# Patient Record
Sex: MALE | Race: WHITE | URBAN - METROPOLITAN AREA
[De-identification: names, ages, dates, MRNs, and addresses within clinical notes are randomized per-mention and may not be internally consistent; named-entity substitution may affect disease eponyms.]

---

## 2022-09-28 ENCOUNTER — OFFICE VISIT (OUTPATIENT)
Dept: URBAN - METROPOLITAN AREA CLINIC 48 | Facility: CLINIC | Age: 62
End: 2022-09-28

## 2022-09-28 DIAGNOSIS — D31.32 BENIGN NEOPLASM OF LEFT CHOROID: Primary | ICD-10-CM

## 2022-09-28 PROCEDURE — 99204 OFFICE O/P NEW MOD 45 MIN: CPT | Performed by: OPHTHALMOLOGY

## 2022-09-28 ASSESSMENT — INTRAOCULAR PRESSURE
OS: 16
OD: 16

## 2022-09-28 NOTE — IMPRESSION/PLAN
Impression: Benign neoplasm of left choroid: D31.32. Plan: choroidal nevus less than 1D on supero nasal macula Fundus photo taken today for future review RTC 6mo DE

## 2024-07-24 ENCOUNTER — OFFICE VISIT (OUTPATIENT)
Dept: URBAN - METROPOLITAN AREA CLINIC 48 | Facility: CLINIC | Age: 64
End: 2024-07-24
Payer: COMMERCIAL

## 2024-07-24 DIAGNOSIS — H40.013 OPEN ANGLE WITH BORDERLINE FINDINGS, LOW RISK, BILATERAL: ICD-10-CM

## 2024-07-24 DIAGNOSIS — H02.834 DERMATOCHALASIS OF LT UPPER EYELID: ICD-10-CM

## 2024-07-24 DIAGNOSIS — H02.831 DERMATOCHALASIS OF RIGHT UPPER EYELID: ICD-10-CM

## 2024-07-24 DIAGNOSIS — H49.02 THIRD NERVE PALSY OF LEFT EYE: Primary | ICD-10-CM

## 2024-07-24 PROCEDURE — 92133 CPTRZD OPH DX IMG PST SGM ON: CPT | Performed by: OPHTHALMOLOGY

## 2024-07-24 PROCEDURE — 99212 OFFICE O/P EST SF 10 MIN: CPT | Performed by: OPHTHALMOLOGY

## 2024-07-24 ASSESSMENT — INTRAOCULAR PRESSURE
OD: 15
OS: 18

## 2024-07-24 ASSESSMENT — VISUAL ACUITY
OD: 20/20
OS: 20/20

## 2024-08-16 ENCOUNTER — OFFICE VISIT (OUTPATIENT)
Dept: URBAN - METROPOLITAN AREA CLINIC 48 | Facility: CLINIC | Age: 64
End: 2024-08-16
Payer: COMMERCIAL

## 2024-08-16 DIAGNOSIS — H53.2 DIPLOPIA: Primary | ICD-10-CM

## 2024-08-16 DIAGNOSIS — H57.02 ANISOCORIA: ICD-10-CM

## 2024-08-16 DIAGNOSIS — H40.013 OPEN ANGLE WITH BORDERLINE FINDINGS, LOW RISK, BILATERAL: ICD-10-CM

## 2024-08-16 DIAGNOSIS — H02.402 PTOSIS OF LT EYELID: ICD-10-CM

## 2024-08-16 DIAGNOSIS — H49.02 THIRD NERVE PALSY OF LEFT EYE: ICD-10-CM

## 2024-08-16 PROCEDURE — 99213 OFFICE O/P EST LOW 20 MIN: CPT | Performed by: OPHTHALMOLOGY

## 2024-08-16 PROCEDURE — 92083 EXTENDED VISUAL FIELD XM: CPT | Performed by: OPHTHALMOLOGY

## 2024-08-16 ASSESSMENT — INTRAOCULAR PRESSURE
OD: 19
OS: 19

## 2024-09-17 ENCOUNTER — OFFICE VISIT (OUTPATIENT)
Dept: URBAN - METROPOLITAN AREA CLINIC 48 | Facility: CLINIC | Age: 64
End: 2024-09-17
Payer: COMMERCIAL

## 2024-09-17 DIAGNOSIS — H53.2 DIPLOPIA: Primary | ICD-10-CM

## 2024-09-17 PROCEDURE — 99213 OFFICE O/P EST LOW 20 MIN: CPT | Performed by: OPHTHALMOLOGY

## 2024-09-17 RX ORDER — PREDNISONE 10 MG/1
10 MG TABLET ORAL
Qty: 75 | Refills: 0 | Status: ACTIVE
Start: 2024-09-17

## 2024-09-17 ASSESSMENT — INTRAOCULAR PRESSURE
OD: 16
OS: 16

## 2024-12-17 ENCOUNTER — OFFICE VISIT (OUTPATIENT)
Dept: URBAN - METROPOLITAN AREA CLINIC 48 | Facility: CLINIC | Age: 64
End: 2024-12-17
Payer: COMMERCIAL

## 2024-12-17 DIAGNOSIS — H53.2 DIPLOPIA: Primary | ICD-10-CM

## 2024-12-17 PROCEDURE — 99213 OFFICE O/P EST LOW 20 MIN: CPT | Performed by: OPHTHALMOLOGY

## 2024-12-17 RX ORDER — PREDNISONE 10 MG/1
10 MG TABLET ORAL
Qty: 75 | Refills: 1 | Status: ACTIVE
Start: 2024-12-17

## 2024-12-17 ASSESSMENT — INTRAOCULAR PRESSURE
OD: 18
OS: 19

## 2025-03-18 ENCOUNTER — OFFICE VISIT (OUTPATIENT)
Dept: URBAN - METROPOLITAN AREA CLINIC 48 | Facility: CLINIC | Age: 65
End: 2025-03-18
Payer: COMMERCIAL

## 2025-03-18 DIAGNOSIS — H40.013 OPEN ANGLE WITH BORDERLINE FINDINGS, LOW RISK, BILATERAL: ICD-10-CM

## 2025-03-18 DIAGNOSIS — H53.2 DIPLOPIA: Primary | ICD-10-CM

## 2025-03-18 PROCEDURE — 99214 OFFICE O/P EST MOD 30 MIN: CPT | Performed by: OPHTHALMOLOGY

## 2025-03-18 ASSESSMENT — INTRAOCULAR PRESSURE
OS: 17
OD: 17